# Patient Record
Sex: FEMALE | Employment: FULL TIME | URBAN - METROPOLITAN AREA
[De-identification: names, ages, dates, MRNs, and addresses within clinical notes are randomized per-mention and may not be internally consistent; named-entity substitution may affect disease eponyms.]

---

## 2017-01-13 ENCOUNTER — OFFICE VISIT (OUTPATIENT)
Dept: OBGYN | Facility: CLINIC | Age: 57
End: 2017-01-13
Attending: OBSTETRICS & GYNECOLOGY
Payer: COMMERCIAL

## 2017-01-13 VITALS
WEIGHT: 109 LBS | BODY MASS INDEX: 23.51 KG/M2 | HEIGHT: 57 IN | DIASTOLIC BLOOD PRESSURE: 79 MMHG | HEART RATE: 65 BPM | SYSTOLIC BLOOD PRESSURE: 138 MMHG

## 2017-01-13 DIAGNOSIS — Z79.890 HORMONE REPLACEMENT THERAPY: ICD-10-CM

## 2017-01-13 DIAGNOSIS — Z01.419 ENCOUNTER FOR GYNECOLOGICAL EXAMINATION WITHOUT ABNORMAL FINDING: Primary | ICD-10-CM

## 2017-01-13 DIAGNOSIS — Z13.220 LIPID SCREENING: ICD-10-CM

## 2017-01-13 DIAGNOSIS — B37.31 VAGINAL YEAST INFECTION: ICD-10-CM

## 2017-01-13 PROCEDURE — 99212 OFFICE O/P EST SF 10 MIN: CPT | Mod: ZF

## 2017-01-13 RX ORDER — FLUCONAZOLE 150 MG/1
150 TABLET ORAL ONCE
Qty: 2 TABLET | Refills: 3 | Status: SHIPPED | OUTPATIENT
Start: 2017-01-13 | End: 2017-01-13

## 2017-01-13 NOTE — PROGRESS NOTES
Progress Note    SUBJECTIVE:  Diana Tay is an 56 year old  No obstetric history on file., who requests a breast and pelvic exam.    Patient is followed by Dr. Barriga for primary care.    Concerns today include: discussion of hormone therapy.  Patient has used the estradiol ring (Femring) and aygestin for hormone therapy for a while.  She denies hot flashes and night sweats.  She is bothered by some residual vaginal dryness and has used vagifem on a prn basis.  She wonders what she should do for hormone therapy now.    She requests a refill of diflucan in case she needs it on an upcoming cruise.    Menstrual History:  Menstrual History 10/15/2012 1/27/2014 1/25/2016 7/29/2016   LAST MENSTRUAL PERIOD 9/21/2012 1/10/2014 10/25/2015 7/26/2016       Last  No results found for this basename: pap  History of abnormal Pap smear: NO - age 30- 65 PAP every 3 years recommended, done at outside clinic and is up to date.    Last No results found for this basename: HPV16  Last No results found for this basename: hpv18  Last No results found for this basename: hrhpv    Mammogram current: yes    HISTORY:  Prescription Medications as of 1/13/2017             Estradiol Acetate (FEMRING) 0.05 MG/24HR RING Place 1 Device vaginally every 3 months    norethindrone (AYGESTIN) 5 MG tablet Take 5 mg by mouth daily Once daily for 10 days every three months with placement of new ring.    Blood Pressure Monitor KIT Check blood pressure twice daily    Omega-3 Fatty Acids (OMEGA-3 FISH OIL PO)     Phenazopyridine HCl (AZO DINE PO)     Cholecalciferol (HM VITAMIN D3) 4000 UNITS CAPS     estradiol (VAGIFEM) 10 MCG TABS Place 10 mcg vaginally twice a week        No Known Allergies  Immunization History   Administered Date(s) Administered     Influenza (IIV3) 10/15/2013     Tdap (Adacel,Boostrix) 08/09/2011       Obstetric History     No data available     No past medical history on file.  Past Surgical History   Procedure Laterality Date  "     section       Colonoscopy N/A 11/3/2014     Procedure: COLONOSCOPY;  Surgeon: Sukumar Han MD;  Location: [facility]     Family History   Problem Relation Age of Onset     Thyroid Disease Mother      Lipids Mother      HEART DISEASE Father      Cancer - colorectal No family hx of      Social History     Social History     Marital Status:      Spouse Name: N/A     Number of Children: N/A     Years of Education: N/A     Social History Main Topics     Smoking status: Never Smoker      Smokeless tobacco: Never Used     Alcohol Use: Yes      Comment: 6 glasses of wine a week.     Drug Use: No     Sexual Activity:     Partners: Male     Birth Control/ Protection: Surgical     Other Topics Concern     Parent/Sibling W/ Cabg, Mi Or Angioplasty Before 65f 55m? Yes     Social History Narrative       ROS    EXAM:  Blood pressure 138/79, pulse 65, height 1.448 m (4' 9\"), weight 49.442 kg (109 lb), not currently breastfeeding. Body mass index is 23.58 kg/(m^2).  General appearance: Pleasant female in no acute distress.     BREAST EXAM:  Breast: Without visible skin changes. No dimpling or lesions seen.   Breasts supple, non-tender with palpation, no dominant mass, nodularity, or nipple discharge noted bilaterally. Axillary nodes negative.      PELVIC EXAM:  EG/BUS: Normal genital architecture without lesions, erythema or abnormal secretions Bartholin's, Urethra, [place]'s normal   Urethral meatus: normal    Urethra: no masses, tenderness, or scarring    Bladder: no masses or tenderness    Vagina: moist, pink, rugae with creamy, white and odorless  secretions  Cervix: no lesions and pink, moist, closed, without lesion or CMT  Uterus: small, smooth, firm, mobile w/o pain  Adnexa: Within normal limits and No masses, nodularity, tenderness  Rectum:anus normal, digital rectal exam negative, anal sphincter tone normal, rectal/vaginal exam confirms above findings       ASSESSMENT:  Encounter Diagnoses   Name Primary? "     Encounter for gynecological examination without abnormal finding [Z01.419] Yes     Hormone replacement therapy      Vaginal yeast infection      Lipid screening       56 year old Female Pelvic and Breast Exam  HRT Surveillance    PLAN:   Orders Placed This Encounter   Procedures     Pelvic and Breast Exam Procedure []     Lipid Profile     Discussed hormone therapy and use of progesterone.  Patient interested in trial of vaginal estrogen only and will switch of Estring.  No progesterone therapy needed.    Discussed vulvar cares, avoid soap, cotton underwear, avoid topical therapy    Occasional yeast infections and going on a cruise request prescription for diflucan, sent to pharmacy    Review of records indication mildly elevated LDL, started fish oil , will repeat, to have labs drawn at work and faxed here.    Return in one year/PRN for preventive care or problems/concerns.     Verbalized understanding and agreement with visit plan.    Ines Diaz MD

## 2017-01-13 NOTE — Clinical Note
1/13/2017       RE: Diana Tay  [address] [facility] [address]     Dear Colleague,    Thank you for referring your patient, Diana Tay, to the [facility] at [facility]. Please see a copy of my visit note below.      Progress Note    SUBJECTIVE:  Diana Tay is an 56 year old  No obstetric history on file., who requests a breast and pelvic exam.    Patient is followed by Dr. Barriga for primary care.    Concerns today include: discussion of hormone therapy.  Patient has used the estradiol ring (Femring) and aygestin for hormone therapy for a while.  She denies hot flashes and night sweats.  She is bothered by some residual vaginal dryness and has used vagifem on a prn basis.  She wonders what she should do for hormone therapy now.    She requests a refill of diflucan in case she needs it on an upcoming cruise.    Menstrual History:  Menstrual History 10/15/2012 1/27/2014 1/25/2016 7/29/2016   LAST MENSTRUAL PERIOD 9/21/2012 1/10/2014 10/25/2015 7/26/2016       Last  No results found for this basename: pap  History of abnormal Pap smear: NO - age 30- 65 PAP every 3 years recommended, done at outside clinic and is up to date.    Last No results found for this basename: HPV16  Last No results found for this basename: hpv18  Last No results found for this basename: hrhpv    Mammogram current: yes    HISTORY:  Prescription Medications as of 1/13/2017             Estradiol Acetate (FEMRING) 0.05 MG/24HR RING Place 1 Device vaginally every 3 months    norethindrone (AYGESTIN) 5 MG tablet Take 5 mg by mouth daily Once daily for 10 days every three months with placement of new ring.    Blood Pressure Monitor KIT Check blood pressure twice daily    Omega-3 Fatty Acids (OMEGA-3 FISH OIL PO)     Phenazopyridine HCl (AZO DINE PO)     Cholecalciferol (HM VITAMIN D3) 4000 UNITS CAPS     estradiol (VAGIFEM) 10 MCG TABS Place 10 mcg vaginally twice a  "week        No Known Allergies  Immunization History   Administered Date(s) Administered     Influenza (IIV3) 10/15/2013     Tdap (Adacel,Boostrix) 2011       Obstetric History     No data available     No past medical history on file.  Past Surgical History   Procedure Laterality Date      section       Colonoscopy N/A 11/3/2014     Procedure: COLONOSCOPY;  Surgeon: Sukumar Han MD;  Location:  GI     Family History   Problem Relation Age of Onset     Thyroid Disease Mother      Lipids Mother      HEART DISEASE Father      Cancer - colorectal No family hx of      Social History     Social History     Marital Status:      Spouse Name: N/A     Number of Children: N/A     Years of Education: N/A     Social History Main Topics     Smoking status: Never Smoker      Smokeless tobacco: Never Used     Alcohol Use: Yes      Comment: 6 glasses of wine a week.     Drug Use: No     Sexual Activity:     Partners: Male     Birth Control/ Protection: Surgical     Other Topics Concern     Parent/Sibling W/ Cabg, Mi Or Angioplasty Before 65f 55m? Yes     Social History Narrative       ROS    EXAM:  Blood pressure 138/79, pulse 65, height 1.448 m (4' 9\"), weight 49.442 kg (109 lb), not currently breastfeeding. Body mass index is 23.58 kg/(m^2).  General appearance: Pleasant female in no acute distress.     BREAST EXAM:  Breast: Without visible skin changes. No dimpling or lesions seen.   Breasts supple, non-tender with palpation, no dominant mass, nodularity, or nipple discharge noted bilaterally. Axillary nodes negative.      PELVIC EXAM:  EG/BUS: Normal genital architecture without lesions, erythema or abnormal secretions Bartholin's, Urethra, [place]'s normal   Urethral meatus: normal    Urethra: no masses, tenderness, or scarring    Bladder: no masses or tenderness    Vagina: moist, pink, rugae with creamy, white and odorless  secretions  Cervix: no lesions and pink, moist, closed, without lesion or " CMT  Uterus: small, smooth, firm, mobile w/o pain  Adnexa: Within normal limits and No masses, nodularity, tenderness  Rectum:anus normal, digital rectal exam negative, anal sphincter tone normal, rectal/vaginal exam confirms above findings       ASSESSMENT:  Encounter Diagnoses   Name Primary?     Encounter for gynecological examination without abnormal finding [Z01.419] Yes     Hormone replacement therapy      Vaginal yeast infection      Lipid screening       56 year old Female Pelvic and Breast Exam  HRT Surveillance    PLAN:   Orders Placed This Encounter   Procedures     Pelvic and Breast Exam Procedure []     Lipid Profile     Discussed hormone therapy and use of progesterone.  Patient interested in trial of vaginal estrogen only and will switch of Estring.  No progesterone therapy needed.    Discussed vulvar cares, avoid soap, cotton underwear, avoid topical therapy    Occasional yeast infections and going on a cruise request prescription for diflucan, sent to pharmacy    Review of records indication mildly elevated LDL, started fish oil , will repeat, to have labs drawn at work and faxed here.    Return in one year/PRN for preventive care or problems/concerns.     Verbalized understanding and agreement with visit plan.    Ines Diaz MD

## 2017-01-18 ENCOUNTER — AMBULATORY - HEALTHEAST (OUTPATIENT)
Dept: LAB | Facility: HOSPITAL | Age: 57
End: 2017-01-18

## 2017-01-18 ENCOUNTER — TRANSFERRED RECORDS (OUTPATIENT)
Dept: HEALTH INFORMATION MANAGEMENT | Facility: CLINIC | Age: 57
End: 2017-01-18

## 2017-01-18 DIAGNOSIS — Z01.419 ROUTINE GYNECOLOGICAL EXAMINATION: ICD-10-CM

## 2017-01-18 DIAGNOSIS — Z13.220 SCREENING FOR LIPOID DISORDERS: ICD-10-CM

## 2017-01-18 LAB
CHOLEST SERPL-MCNC: 242 MG/DL
FASTING STATUS PATIENT QL REPORTED: YES
HDLC SERPL-MCNC: 73 MG/DL
LDLC SERPL CALC-MCNC: 154 MG/DL
TRIGL SERPL-MCNC: 75 MG/DL

## 2017-02-14 ENCOUNTER — TELEPHONE (OUTPATIENT)
Dept: OBGYN | Facility: CLINIC | Age: 57
End: 2017-02-14

## 2017-05-16 ENCOUNTER — TELEPHONE (OUTPATIENT)
Dept: OBGYN | Facility: CLINIC | Age: 57
End: 2017-05-16

## 2017-05-16 DIAGNOSIS — Z13.9 SCREENING: Primary | ICD-10-CM

## 2017-05-16 NOTE — TELEPHONE ENCOUNTER
Routing pts request for lab work via [employer] to Dr. Diaz. She is requesting routine lab work for cholesterol, however needs HIV, hep b, and hep c because she received a puncture wound from an instrument at the dental office.

## 2017-05-17 ENCOUNTER — MYC MEDICAL ADVICE (OUTPATIENT)
Dept: OBGYN | Facility: CLINIC | Age: 57
End: 2017-05-17

## 2017-05-17 DIAGNOSIS — Z13.220 SCREENING CHOLESTEROL LEVEL: ICD-10-CM

## 2017-05-17 DIAGNOSIS — Z20.828 CONTACT WITH OR EXPOSURE TO VIRAL DISEASE: Primary | ICD-10-CM

## 2017-05-23 ENCOUNTER — AMBULATORY - HEALTHEAST (OUTPATIENT)
Dept: LAB | Facility: HOSPITAL | Age: 57
End: 2017-05-23

## 2017-05-23 ENCOUNTER — TRANSFERRED RECORDS (OUTPATIENT)
Dept: HEALTH INFORMATION MANAGEMENT | Facility: CLINIC | Age: 57
End: 2017-05-23

## 2017-05-23 DIAGNOSIS — Z13.220 SCREENING FOR CHOLESTEROL LEVEL: ICD-10-CM

## 2017-05-23 DIAGNOSIS — Z13.9 SCREENING: ICD-10-CM

## 2017-05-23 DIAGNOSIS — Z20.828 CONTACT WITH OR EXPOSURE TO VIRAL DISEASE: ICD-10-CM

## 2017-05-23 LAB
CHOLEST SERPL-MCNC: 201 MG/DL
FASTING STATUS PATIENT QL REPORTED: YES
HBV SURFACE AG SERPL QL IA: NEGATIVE
HCV AB SERPL QL IA: NEGATIVE
HDLC SERPL-MCNC: 66 MG/DL
HEPATITIS B SURFACE ANTIBODY LHE- HISTORICAL: POSITIVE
HIV 1+2 AB+HIV1 P24 AG SERPL QL IA: NEGATIVE
LDLC SERPL CALC-MCNC: 119 MG/DL
TRIGL SERPL-MCNC: 80 MG/DL

## 2018-01-05 DIAGNOSIS — Z79.890 HORMONE REPLACEMENT THERAPY: ICD-10-CM

## 2018-01-05 NOTE — TELEPHONE ENCOUNTER
Received refill request for estring. Due for annual. Left patient a message that she needs to call and make appt and that temporary refill will be sent.

## 2018-03-23 ENCOUNTER — OFFICE VISIT (OUTPATIENT)
Dept: OBGYN | Facility: CLINIC | Age: 58
End: 2018-03-23
Attending: OBSTETRICS & GYNECOLOGY
Payer: COMMERCIAL

## 2018-03-23 VITALS
DIASTOLIC BLOOD PRESSURE: 76 MMHG | HEART RATE: 60 BPM | SYSTOLIC BLOOD PRESSURE: 135 MMHG | HEIGHT: 57 IN | BODY MASS INDEX: 23.73 KG/M2 | WEIGHT: 110 LBS

## 2018-03-23 DIAGNOSIS — Z12.4 SCREENING FOR MALIGNANT NEOPLASM OF CERVIX: ICD-10-CM

## 2018-03-23 DIAGNOSIS — Z79.890 HORMONE REPLACEMENT THERAPY: ICD-10-CM

## 2018-03-23 DIAGNOSIS — Z01.419 ENCOUNTER FOR GYNECOLOGICAL EXAMINATION WITHOUT ABNORMAL FINDING: Primary | ICD-10-CM

## 2018-03-23 PROCEDURE — G0145 SCR C/V CYTO,THINLAYER,RESCR: HCPCS | Performed by: OBSTETRICS & GYNECOLOGY

## 2018-03-23 PROCEDURE — 87624 HPV HI-RISK TYP POOLED RSLT: CPT | Performed by: OBSTETRICS & GYNECOLOGY

## 2018-03-23 NOTE — LETTER
3/23/2018       RE: Diana Tay  [address] [facility] [address]     Dear Colleague,    Thank you for referring your patient, Diana Tay, to the [facility] at [facility]. Please see a copy of my visit note below.        Progress Note    SUBJECTIVE:  Diana Tay is an 57 year old, who requests a breast and pelvic exam.    Patient is followed by Dr. Barriga for primary care.    Concerns today include: Desires refill of Estring.  This is working well for vaginal menopausal symptoms. She has occasional hot flashes and night She just returned from a cruise and is doing well.    Menstrual History:  Menstrual History 10/15/2012 2014 2016 2016   LAST MENSTRUAL PERIOD 2012 1/10/2014 10/25/2015 2016       Last  No results found for: Done at [facility] 1/20/15 NIL  History of abnormal Pap smear: NO - age 30-65 PAP every 5 years with negative HPV co-testing recommended    No prior HPV testing completed    Mammogram current: yes and done at outside institution    HISTORY:  Prescription Medications as of 3/23/2018             estradiol (ESTRING) 2 MG vaginal ring Place 1 each vaginally every 3 months    Blood Pressure Monitor KIT Check blood pressure twice daily    Omega-3 Fatty Acids (OMEGA-3 FISH OIL PO)     Phenazopyridine HCl (AZO DINE PO)     Cholecalciferol (HM VITAMIN D3) 4000 UNITS CAPS         No Known Allergies  Immunization History   Administered Date(s) Administered     Influenza (IIV3) PF 10/15/2013     Tdap (Adacel,Boostrix) 2011       Obstetric History     No data available     No past medical history on file.  Past Surgical History:   Procedure Laterality Date      SECTION       COLONOSCOPY N/A 11/3/2014    Procedure: COLONOSCOPY;  Surgeon: Sukumar Han MD;  Location:  GI     Family History   Problem Relation Age of Onset     Thyroid Disease Mother      Lipids Mother      HEART  "DISEASE Father      Cancer - colorectal No family hx of      Social History     Social History     Marital status:      Spouse name: N/A     Number of children: N/A     Years of education: N/A     Social History Main Topics     Smoking status: Never Smoker     Smokeless tobacco: Never Used     Alcohol use Yes      Comment: 6 glasses of wine a week.     Drug use: No     Sexual activity: Yes     Partners: Male     Birth control/ protection: Surgical     Other Topics Concern     Parent/Sibling W/ Cabg, Mi Or Angioplasty Before 65f 55m? Yes     Social History Narrative       ROS    EXAM:  Blood pressure 135/76, pulse 60, height 1.448 m (4' 9\"), weight 49.9 kg (110 lb), last menstrual period 07/26/2016, not currently breastfeeding. Body mass index is 23.8 kg/(m^2).  General appearance: Pleasant female in no acute distress.     BREAST EXAM:  Breast: Tanned skin, no other visible skin changes. No dimpling or lesions seen.   Breasts supple, non-tender with palpation, no dominant mass, nodularity, or nipple discharge noted bilaterally. Axillary nodes negative.      PELVIC EXAM:  EG/BUS: Normal genital architecture without lesions, erythema or abnormal secretions Bartholin's, Urethra, [place]'s normal   Urethral meatus: normal    Urethra: no masses, tenderness, or scarring    Bladder: no masses or tenderness    Vagina: moist, pink, rugae with creamy, white and odorless secretions  Cervix: no lesions and pink, moist, closed, without lesion or CMT  Uterus: small, smooth, firm, mobile w/o pain  Adnexa: Within normal limits and No masses, nodularity, tenderness  Rectum:anus normal, digital rectal exam negative, anal sphincter tone normal, rectal/vaginal exam confirms above findings       ASSESSMENT:  Encounter Diagnoses   Name Primary?     Encounter for gynecological examination without abnormal finding Yes     Hormone replacement therapy      Screening for malignant neoplasm of cervix       57 year old Female Pelvic and " Breast Exam  Vaginal estrogen for urogenital symptoms of menopause    PLAN:   Orders Placed This Encounter   Procedures     Pelvic and Breast Exam Procedure []     Pap Smear Exam []     Pap imaged thin layer screen with HPV - recommended age 30 - 65 years (select HPV order below)     HPV High Risk Types DNA Cervical       Return in one year/PRN for preventive care or problems/concerns.     Verbalized understanding and agreement with visit plan.      Again, thank you for allowing me to participate in the care of your patient.      Sincerely,    Ines Diaz MD

## 2018-03-23 NOTE — MR AVS SNAPSHOT
"              After Visit Summary   3/23/2018    Diana Tay    MRN: [id number]           Patient Information     Date Of Birth          1960        Visit Information        Provider Department      3/23/2018 3:45 PM Ines Diaz MD [facility]        Today's Diagnoses     Encounter for gynecological examination without abnormal finding    -  1    Hormone replacement therapy        Screening for malignant neoplasm of cervix           Follow-ups after your visit        Follow-up notes from your care team     Return in 1 year (on 3/23/2019) for Preventative Health Visit.      Who to contact     Please call your clinic at [phone] to:    Ask questions about your health    Make or cancel appointments    Discuss your medicines    Learn about your test results    Speak to your doctor            Additional Information About Your Visit        MyChart Information     [employer] gives you secure access to your electronic health record. If you see a primary care provider, you can also send messages to your care team and make appointments. If you have questions, please call your primary care clinic.  If you do not have a primary care provider, please call [phone] and they will assist you.      [employer] is an electronic gateway that provides easy, online access to your medical records. With [employer], you can request a clinic appointment, read your test results, renew a prescription or communicate with your care team.     To access your existing account, please contact your [facility] or call [phone] for assistance.        Care EveryWhere ID     This is your Care EveryWhere ID. This could be used by other organizations to access your [facility] medical records  [id number]        Your Vitals Were     Pulse Height Last Period Breastfeeding? BMI (Body Mass Index)       60 1.448 m (4' 9\") 07/26/2016 No 23.8 kg/m2        Blood Pressure from Last 3 Encounters: "   03/23/18 135/76   01/13/17 138/79   07/29/16 130/72    Weight from Last 3 Encounters:   03/23/18 49.9 kg (110 lb)   01/13/17 49.4 kg (109 lb)   07/29/16 49.4 kg (108 lb 14.4 oz)              We Performed the Following     HPV High Risk Types DNA Cervical     Pap imaged thin layer screen with HPV - recommended age 30 - 65 years (select HPV order below)     Pap Smear Exam []     Pelvic and Breast Exam Procedure []          Today's Medication Changes          These changes are accurate as of 3/23/18 11:59 PM.  If you have any questions, ask your nurse or doctor.               Start taking these medicines.        Dose/Directions    estradiol 2 MG vaginal ring   Commonly known as:  ESTRING   Used for:  Hormone replacement therapy   Started by:  Ines Diaz MD        Dose:  1 each   Place 1 each vaginally every 3 months   Quantity:  1 each   Refills:  3            Where to get your medicines      These medications were sent to [facility] [address]  [address], [facility] [address]     Phone:  [phone]     estradiol 2 MG vaginal ring                Primary Care Provider Office Phone # Fax #    Tadeo Barriga [phone]       [address]   [facility] [address]        Equal Access to Services     SILVER SADLER : Hadnoman vazquezo Sotrever, waaxda luqadaha, qaybta kaalmada gianluca, cornel albarado. So [facility] [phone].    ATENCIÓN: Si habla español, tiene a nuñez disposición servicios gratuitos de asistencia lingüística. Llame al [phone].    We comply with applicable federal civil rights laws and [place] laws. We do not discriminate on the basis of race, color, national origin, age, disability, sex, sexual orientation, or gender identity.            Thank you!     Thank you for choosing [facility]  for your care. Our goal is always to provide you with  excellent care. Hearing back from our patients is one way we can continue to improve our services. Please take a few minutes to complete the written survey that you may receive in the mail after your visit with us. Thank you!             Your Updated Medication List - Protect others around you: Learn how to safely use, store and throw away your medicines at www.disposemymeds.org.          This list is accurate as of 3/23/18 11:59 PM.  Always use your most recent med list.                   Brand Name Dispense Instructions for use Diagnosis    AZO DINE PO           Blood Pressure Monitor Kit     1 kit    Check blood pressure twice daily    Elevated blood pressure reading without diagnosis of hypertension       estradiol 2 MG vaginal ring    ESTRING    1 each    Place 1 each vaginally every 3 months    Hormone replacement therapy       HM VITAMIN D3 4000 UNITS Caps   Generic drug:  Cholecalciferol           OMEGA-3 FISH OIL PO

## 2018-03-23 NOTE — PROGRESS NOTES
Progress Note    SUBJECTIVE:  Diana Tay is an 57 year old, who requests a breast and pelvic exam.    Patient is followed by Dr. Barriga for primary care.    Concerns today include: Desires refill of Estring.  This is working well for vaginal menopausal symptoms. She has occasional hot flashes and night She just returned from a cruise and is doing well.    Menstrual History:  Menstrual History 10/15/2012 2014 2016 2016   LAST MENSTRUAL PERIOD 2012 1/10/2014 10/25/2015 2016       Last  No results found for: Done at [facility] 1/20/15 NIL  History of abnormal Pap smear: NO - age 30-65 PAP every 5 years with negative HPV co-testing recommended    No prior HPV testing completed    Mammogram current: yes and done at outside institution    HISTORY:  Prescription Medications as of 3/23/2018             estradiol (ESTRING) 2 MG vaginal ring Place 1 each vaginally every 3 months    Blood Pressure Monitor KIT Check blood pressure twice daily    Omega-3 Fatty Acids (OMEGA-3 FISH OIL PO)     Phenazopyridine HCl (AZO DINE PO)     Cholecalciferol (HM VITAMIN D3) 4000 UNITS CAPS         No Known Allergies  Immunization History   Administered Date(s) Administered     Influenza (IIV3) PF 10/15/2013     Tdap (Adacel,Boostrix) 2011       Obstetric History     No data available     No past medical history on file.  Past Surgical History:   Procedure Laterality Date      SECTION       COLONOSCOPY N/A 11/3/2014    Procedure: COLONOSCOPY;  Surgeon: Sukumar Han MD;  Location:  GI     Family History   Problem Relation Age of Onset     Thyroid Disease Mother      Lipids Mother      HEART DISEASE Father      Cancer - colorectal No family hx of      Social History     Social History     Marital status:      Spouse name: N/A     Number of children: N/A     Years of education: N/A     Social History Main Topics     Smoking status: Never Smoker     Smokeless tobacco: Never Used  "    Alcohol use Yes      Comment: 6 glasses of wine a week.     Drug use: No     Sexual activity: Yes     Partners: Male     Birth control/ protection: Surgical     Other Topics Concern     Parent/Sibling W/ Cabg, Mi Or Angioplasty Before 65f 55m? Yes     Social History Narrative       ROS    EXAM:  Blood pressure 135/76, pulse 60, height 1.448 m (4' 9\"), weight 49.9 kg (110 lb), last menstrual period 07/26/2016, not currently breastfeeding. Body mass index is 23.8 kg/(m^2).  General appearance: Pleasant female in no acute distress.     BREAST EXAM:  Breast: Tanned skin, no other visible skin changes. No dimpling or lesions seen.   Breasts supple, non-tender with palpation, no dominant mass, nodularity, or nipple discharge noted bilaterally. Axillary nodes negative.      PELVIC EXAM:  EG/BUS: Normal genital architecture without lesions, erythema or abnormal secretions Bartholin's, Urethra, [place]'s normal   Urethral meatus: normal    Urethra: no masses, tenderness, or scarring    Bladder: no masses or tenderness    Vagina: moist, pink, rugae with creamy, white and odorless secretions  Cervix: no lesions and pink, moist, closed, without lesion or CMT  Uterus: small, smooth, firm, mobile w/o pain  Adnexa: Within normal limits and No masses, nodularity, tenderness  Rectum:anus normal, digital rectal exam negative, anal sphincter tone normal, rectal/vaginal exam confirms above findings       ASSESSMENT:  Encounter Diagnoses   Name Primary?     Encounter for gynecological examination without abnormal finding Yes     Hormone replacement therapy      Screening for malignant neoplasm of cervix       57 year old Female Pelvic and Breast Exam  Vaginal estrogen for urogenital symptoms of menopause    PLAN:   Orders Placed This Encounter   Procedures     Pelvic and Breast Exam Procedure []     Pap Smear Exam []     Pap imaged thin layer screen with HPV - recommended age 30 - 65 years (select HPV order below)     HPV " High Risk Types DNA Cervical       Return in one year/PRN for preventive care or problems/concerns.     Verbalized understanding and agreement with visit plan.    Ines Diaz MD

## 2018-03-27 LAB
COPATH REPORT: NORMAL
PAP: NORMAL

## 2018-03-29 LAB
FINAL DIAGNOSIS: NORMAL
HPV HR 12 DNA CVX QL NAA+PROBE: NEGATIVE
HPV16 DNA SPEC QL NAA+PROBE: NEGATIVE
HPV18 DNA SPEC QL NAA+PROBE: NEGATIVE
SPECIMEN DESCRIPTION: NORMAL
SPECIMEN SOURCE CVX/VAG CYTO: NORMAL

## 2018-04-09 ENCOUNTER — TRANSFERRED RECORDS (OUTPATIENT)
Dept: HEALTH INFORMATION MANAGEMENT | Facility: CLINIC | Age: 58
End: 2018-04-09

## 2019-02-19 ENCOUNTER — OFFICE VISIT (OUTPATIENT)
Dept: OBGYN | Facility: CLINIC | Age: 59
End: 2019-02-19
Attending: OBSTETRICS & GYNECOLOGY
Payer: COMMERCIAL

## 2019-02-19 VITALS
DIASTOLIC BLOOD PRESSURE: 81 MMHG | SYSTOLIC BLOOD PRESSURE: 132 MMHG | HEIGHT: 57 IN | HEART RATE: 81 BPM | BODY MASS INDEX: 22.87 KG/M2 | WEIGHT: 106 LBS

## 2019-02-19 DIAGNOSIS — Z01.419 ENCOUNTER FOR GYNECOLOGICAL EXAMINATION WITHOUT ABNORMAL FINDING: Primary | ICD-10-CM

## 2019-02-19 DIAGNOSIS — Z79.890 HORMONE REPLACEMENT THERAPY: ICD-10-CM

## 2019-02-19 DIAGNOSIS — N94.10 DYSPAREUNIA, FEMALE: ICD-10-CM

## 2019-02-19 DIAGNOSIS — N95.2 VAGINAL ATROPHY: ICD-10-CM

## 2019-02-19 PROCEDURE — G0463 HOSPITAL OUTPT CLINIC VISIT: HCPCS | Mod: ZF

## 2019-02-19 RX ORDER — MULTIPLE VITAMINS W/ MINERALS TAB 9MG-400MCG
1 TAB ORAL DAILY
COMMUNITY

## 2019-02-19 RX ORDER — ESTRADIOL 0.1 MG/G
CREAM VAGINAL
Qty: 48 G | Refills: 3 | Status: SHIPPED | OUTPATIENT
Start: 2019-02-19

## 2019-02-19 ASSESSMENT — MIFFLIN-ST. JEOR: SCORE: 939.69

## 2019-02-19 ASSESSMENT — PAIN SCALES - GENERAL: PAINLEVEL: NO PAIN (0)

## 2019-02-19 NOTE — LETTER
2/19/2019       RE: Diana Tay  [address] [facility] [id number]     Dear Colleague,    Thank you for referring your patient, Diana Tay, to the [facility] at [facility]. Please see a copy of my visit note below.    Progress Note    SUBJECTIVE:  Diana Tay is an 58 year old, who requests a breast and pelvic exam.    Patient is followed by Dr. Barriga for primary care.    Concerns today include: Desires refill of Estring.  This is working well for vaginal menopausal symptoms. She has occasional hot flashes and night She just returned from a cruise and is doing well.  She has pain at the introitus with intercourse.    Menstrual History:  Menstrual History 10/15/2012 1/27/2014 1/25/2016 7/29/2016   LAST MENSTRUAL PERIOD 9/21/2012 1/10/2014 10/25/2015 7/26/2016     Pap:   Last done in 2018, NILM and HPV neg, recommend repeat in 5 years (2023)  History of abnormal Pap smear: NO - age 30-65 PAP every 5 years with negative HPV co-testing recommended    Mammogram current: yes and done at outside institution    HISTORY:  Prescription Medications as of 2/19/2019       Rx Number Disp Refills Start End Last Dispensed Date Next Fill Date Owning Pharmacy    Blood Pressure Monitor KIT  1 kit 0 7/18/2016    [place], [place] - [address]    Sig: Check blood pressure twice daily    Class: E-Prescribe    Cholecalciferol (HM VITAMIN D3) 4000 UNITS CAPS            Class: Historical    Route: Oral    estradiol (ESTRING) 2 MG vaginal ring  1 each 3 3/23/2018    [place], [place] - [address]    Sig: Place 1 each vaginally every 3 months    Class: E-Prescribe    Route: Vaginal    multivitamin w/minerals (MULTI-VITAMIN) tablet        [place], [place] - [address]    Sig: Take 1 tablet by mouth daily    Class: Historical    Route: Oral    Omega-3 Fatty Acids  "(OMEGA-3 FISH OIL PO)            Class: Historical    Route: Oral    Phenazopyridine HCl (AZO DINE PO)            Class: Historical    Route: Oral        No Known Allergies  Immunization History   Administered Date(s) Administered     Influenza (IIV3) PF 10/15/2013     Tdap (Adacel,Boostrix) 2011       Obstetric History     No data available     No past medical history on file.  Past Surgical History:   Procedure Laterality Date      SECTION       COLONOSCOPY N/A 11/3/2014    Procedure: COLONOSCOPY;  Surgeon: Sukumar Han MD;  Location:  GI     Family History   Problem Relation Age of Onset     Thyroid Disease Mother      Lipids Mother      Heart Disease Father      Cancer - colorectal No family hx of      Social History     Social History     Marital status:      Spouse name: N/A     Number of children: N/A     Years of education: N/A     Social History Main Topics     Smoking status: Never Smoker     Smokeless tobacco: Never Used     Alcohol use Yes      Comment: 6 glasses of wine a week.     Drug use: No     Sexual activity: Yes     Partners: Male     Birth control/ protection: Surgical     Other Topics Concern     Parent/Sibling W/ Cabg, Mi Or Angioplasty Before 65f 55m? Yes     Social History Narrative       ROS    EXAM:  Blood pressure 132/81, pulse 81, height 1.448 m (4' 9\"), weight 48.1 kg (106 lb), last menstrual period 2016, not currently breastfeeding. Body mass index is 22.94 kg/m .  General appearance: Pleasant female in no acute distress.     BREAST EXAM:  Breast: Tanned skin, no other visible skin changes. No dimpling or lesions seen.   Breasts supple, non-tender with palpation, no dominant mass, nodularity, or nipple discharge noted bilaterally. Axillary nodes negative.      PELVIC EXAM:  EG/BUS: Normal genital architecture without lesions, erythema or abnormal secretions Bartholin's, Urethra, [place]'s normal   Urethral meatus: normal    Urethra: no masses, " tenderness, or scarring    Bladder: no masses or tenderness    Vagina: moist, pink, rugae with creamy, white and odorless secretions  Cervix: no lesions and pink, moist, closed, without lesion or CMT  Uterus: small, smooth, firm, mobile w/o pain  Adnexa: Within normal limits and No masses, nodularity, tenderness  Rectum:anus normal, digital rectal exam negative, anal sphincter tone normal, rectal/vaginal exam confirms above findings       ASSESSMENT:  Encounter Diagnoses   Name Primary?     Encounter for gynecological examination without abnormal finding Yes     Hormone replacement therapy      Vaginal atrophy      Dyspareunia, female       57 year old Female Pelvic and Breast Exam  Vaginal estrogen for urogenital symptoms of menopause    PLAN:   Orders Placed This Encounter   Procedures     Pelvic and Breast Exam Procedure []     Discussed additional estrogen cream at introitus, prescription for estrace cream given.  Refill of Estring.    Return in one year/PRN for preventive care or problems/concerns.     Verbalized understanding and agreement with visit plan.    Sammie Escobar MD  OB/GYN PGY-1  02/19/19 4:01 PM     Appreciate Dr. Escobar's note above, patient also seen and examined by me along with resident. I agree with the note above.     Ines Diaz MD

## 2019-02-19 NOTE — PROGRESS NOTES
Progress Note    SUBJECTIVE:  Diana Tay is an 58 year old, who requests a breast and pelvic exam.    Patient is followed by Dr. Barriga for primary care.    Concerns today include: Desires refill of Estring.  This is working well for vaginal menopausal symptoms. She has occasional hot flashes and night She just returned from a cruise and is doing well.  She has pain at the introitus with intercourse.    Menstrual History:  Menstrual History 10/15/2012 1/27/2014 1/25/2016 7/29/2016   LAST MENSTRUAL PERIOD 9/21/2012 1/10/2014 10/25/2015 7/26/2016     Pap:   Last done in 2018, NILM and HPV neg, recommend repeat in 5 years (2023)  History of abnormal Pap smear: NO - age 30-65 PAP every 5 years with negative HPV co-testing recommended    Mammogram current: yes and done at outside institution    HISTORY:  Prescription Medications as of 2/19/2019       Rx Number Disp Refills Start End Last Dispensed Date Next Fill Date Owning Pharmacy    Blood Pressure Monitor KIT  1 kit 0 7/18/2016    [place], [place] - [address]    Sig: Check blood pressure twice daily    Class: E-Prescribe    Cholecalciferol (HM VITAMIN D3) 4000 UNITS CAPS            Class: Historical    Route: Oral    estradiol (ESTRING) 2 MG vaginal ring  1 each 3 3/23/2018    [place], [place] - [address]    Sig: Place 1 each vaginally every 3 months    Class: E-Prescribe    Route: Vaginal    multivitamin w/minerals (MULTI-VITAMIN) tablet        [place], [place] - [address]    Sig: Take 1 tablet by mouth daily    Class: Historical    Route: Oral    Omega-3 Fatty Acids (OMEGA-3 FISH OIL PO)            Class: Historical    Route: Oral    Phenazopyridine HCl (AZO DINE PO)            Class: Historical    Route: Oral        No Known Allergies  Immunization History   Administered Date(s) Administered     Influenza (IIV3) PF 10/15/2013     Tdap (Adacel,Boostrix)  "2011       Obstetric History     No data available     No past medical history on file.  Past Surgical History:   Procedure Laterality Date      SECTION       COLONOSCOPY N/A 11/3/2014    Procedure: COLONOSCOPY;  Surgeon: Sukumar Han MD;  Location:  GI     Family History   Problem Relation Age of Onset     Thyroid Disease Mother      Lipids Mother      Heart Disease Father      Cancer - colorectal No family hx of      Social History     Social History     Marital status:      Spouse name: N/A     Number of children: N/A     Years of education: N/A     Social History Main Topics     Smoking status: Never Smoker     Smokeless tobacco: Never Used     Alcohol use Yes      Comment: 6 glasses of wine a week.     Drug use: No     Sexual activity: Yes     Partners: Male     Birth control/ protection: Surgical     Other Topics Concern     Parent/Sibling W/ Cabg, Mi Or Angioplasty Before 65f 55m? Yes     Social History Narrative       ROS    EXAM:  Blood pressure 132/81, pulse 81, height 1.448 m (4' 9\"), weight 48.1 kg (106 lb), last menstrual period 2016, not currently breastfeeding. Body mass index is 22.94 kg/m .  General appearance: Pleasant female in no acute distress.     BREAST EXAM:  Breast: Tanned skin, no other visible skin changes. No dimpling or lesions seen.   Breasts supple, non-tender with palpation, no dominant mass, nodularity, or nipple discharge noted bilaterally. Axillary nodes negative.      PELVIC EXAM:  EG/BUS: Normal genital architecture without lesions, erythema or abnormal secretions Bartholin's, Urethra, [place]'s normal   Urethral meatus: normal    Urethra: no masses, tenderness, or scarring    Bladder: no masses or tenderness    Vagina: moist, pink, rugae with creamy, white and odorless secretions  Cervix: no lesions and pink, moist, closed, without lesion or CMT  Uterus: small, smooth, firm, mobile w/o pain  Adnexa: Within normal limits and No masses, nodularity, " tenderness  Rectum:anus normal, digital rectal exam negative, anal sphincter tone normal, rectal/vaginal exam confirms above findings       ASSESSMENT:  Encounter Diagnoses   Name Primary?     Encounter for gynecological examination without abnormal finding Yes     Hormone replacement therapy      Vaginal atrophy      Dyspareunia, female       57 year old Female Pelvic and Breast Exam  Vaginal estrogen for urogenital symptoms of menopause    PLAN:   Orders Placed This Encounter   Procedures     Pelvic and Breast Exam Procedure []     Discussed additional estrogen cream at introitus, prescription for estrace cream given.  Refill of Estring.    Return in one year/PRN for preventive care or problems/concerns.     Verbalized understanding and agreement with visit plan.    Sammie Escobar MD  OB/GYN PGY-1  02/19/19 4:01 PM     Appreciate Dr. Escobar's note above, patient also seen and examined by me along with resident. I agree with the note above.   Ines Diaz MD

## 2019-04-09 ENCOUNTER — TRANSFERRED RECORDS (OUTPATIENT)
Dept: HEALTH INFORMATION MANAGEMENT | Facility: CLINIC | Age: 59
End: 2019-04-09

## 2019-09-30 ENCOUNTER — HEALTH MAINTENANCE LETTER (OUTPATIENT)
Age: 59
End: 2019-09-30

## 2020-01-31 ENCOUNTER — OFFICE VISIT (OUTPATIENT)
Dept: OBGYN | Facility: CLINIC | Age: 60
End: 2020-01-31
Attending: OBSTETRICS & GYNECOLOGY
Payer: COMMERCIAL

## 2020-01-31 VITALS
WEIGHT: 105.1 LBS | BODY MASS INDEX: 22.67 KG/M2 | DIASTOLIC BLOOD PRESSURE: 79 MMHG | HEART RATE: 64 BPM | HEIGHT: 57 IN | SYSTOLIC BLOOD PRESSURE: 123 MMHG

## 2020-01-31 DIAGNOSIS — Z00.00 VISIT FOR PREVENTIVE HEALTH EXAMINATION: Primary | ICD-10-CM

## 2020-01-31 DIAGNOSIS — Z79.890 HORMONE REPLACEMENT THERAPY: ICD-10-CM

## 2020-01-31 DIAGNOSIS — N95.2 VAGINAL ATROPHY: ICD-10-CM

## 2020-01-31 PROCEDURE — G0463 HOSPITAL OUTPT CLINIC VISIT: HCPCS | Mod: ZF

## 2020-01-31 ASSESSMENT — MIFFLIN-ST. JEOR: SCORE: 930.61

## 2020-01-31 NOTE — PROGRESS NOTES
Progress Note    SUBJECTIVE:  Diana Tay is an 59 year old, . who requests an Annual Preventive Exam.   She sees Dr. Barriga for routine care.  She is in good health.  She uses the estring for vaginal itching; she also uses some estrogen cream 3x/week.  She does not have problems with intercourse.   She denies Vaginal bleeding.    Menstrual History:  Menstrual History 2014   LAST MENSTRUAL PERIOD 1/10/2014 10/25/2015 2016       Last    Lab Results   Component Value Date    PAP NIL 2018         Last   Lab Results   Component Value Date    HPV16 Negative 2018     Last   Lab Results   Component Value Date    HPV18 Negative 2018     Last   Lab Results   Component Value Date    HRHPV Negative 2018       Mammogram current: yes  Last Mammogram: 2019      Last Colonoscopy:  Results for orders placed or performed during the hospital encounter of 14   COLONOSCOPY   Result Value Ref Range    COLONOSCOPY       [facility]  _______________________________________________________________________________  Patient Name: Diana Tay        Procedure Date: 11/3/2014 2:07 PM  MRN: [id number]                       Account Number: [id number]  YOB: 1960             Admit Type: Outpatient  Age: 54                               Gender: Female  Attending MD: Sukumar Han MD    Instrument Name: P-138  _______________________________________________________________________________     Procedure:                Colonoscopy  Indications:              Screening for colorectal malignant neoplasm  Providers:                Sukumar Han MD  Referring MD:             Lorraine Horta MD, Tadeo Barriga MD  Medicines:                Midazolam 2 mg IV, Fentanyl 100 micrograms IV  Complications:            No immediate complications.  _______________________________________________________________________________  Procedure:                 Pre-Anesthesia Assessment:                             - Prior to the procedure, a History and Physical                             was performed, and patient medications and                             allergies were reviewed. The patient is competent.                             The risks and benefits of the procedure and the                             sedation options and risks were discussed with the                             patient. All questions were answered and informed                             consent was obtained. Patient identification and                             proposed procedure were verified by the physician                             in the procedure room. Mental Status Examination:                             alert and oriented. Airway Examination: normal                             oropharyngeal airway and neck mobility. Respiratory                             Examination: clear to auscultation. CV Examination:                             normal. Prophylactic Antibiotics: The patient do es                             not require prophylactic antibiotics. Prior                             Anticoagulants: The patient has taken no previous                             anticoagulant or antiplatelet agents. ASA Grade                             Assessment: I - A normal, healthy patient. After                             reviewing the risks and benefits, the patient was                             deemed in satisfactory condition to undergo the                             procedure. The anesthesia plan was to use moderate                             sedation / analgesia (conscious sedation).                             Immediately prior to administration of medications,                             the patient was re-assessed for adequacy to receive                             sedatives. The heart rate, respiratory rate, oxygen                             saturations, blood pressure,  adequacy of pulmonary                             ventilation, and response to care were flo tored                             throughout the procedure. The physical status of                             the patient was re-assessed after the procedure.                            After obtaining informed consent, the colonoscope                             was passed under direct vision. Throughout the                             procedure, the patient's blood pressure, pulse, and                             oxygen saturations were monitored continuously. The                             PCF-H190L [id number] was introduced through the anus                             and advanced to the cecum, identified by                             appendiceal orifice and ileocecal valve. The                             colonoscopy was performed without difficulty. The                             patient tolerated the procedure well. The quality                             of the bowel preparation was good.                                                                                   Finding s:       The perianal and digital rectal examinations were normal.       The entire examined colon appeared normal on direct and retroflexion        views.                                                                                   Impression:               - The entire examined colon is normal on direct and                             retroflexion views.  Recommendation:           - Repeat colonoscopy in 10 years for screening                             purposes.                                                                                   Procedure Code(s):       --- Professional ---       , Colorectal cancer screening; colonoscopy on individual not        meeting criteria for high risk  Diagnosis Code(s):       --- Professional ---       V76.51, Special screening for malignant neoplasms of colon    CPT copyright 2013  American Medical Association. All rights reserved.    The codes documented in this report are preliminary and upon  review may   be revised to meet  current compliance requirements.    Electronically signed by Sukumar Han MD  ____________________  Sukumar Han MD  11/3/2014 2:34 PM  I was physically present for the entire viewing portion of the exam.  __________________________  Number of Addenda: 0    Note Initiated On: 11/3/2014 2:07 PM  MRN:                      [id number]  Procedure Date:           11/3/2014 2:07:44 PM  Scope Withdrawal Time: 0 hours 6 minutes 43 seconds   Total Procedure Duration: 0 hours 11 minutes 49 seconds   Scope In: 2:17:46 PM  Scope Out: 2:29:35 PM           HISTORY:  Cholecalciferol (HM VITAMIN D3) 4000 UNITS CAPS,   estradiol (ESTRACE) 0.1 MG/GM vaginal cream, Apply a pea size amount to the introitus twice weekly  estradiol (ESTRING) 2 MG vaginal ring, Place 1 each vaginally every 3 months  Omega-3 Fatty Acids (OMEGA-3 FISH OIL PO),   Blood Pressure Monitor KIT, Check blood pressure twice daily  multivitamin w/minerals (MULTI-VITAMIN) tablet, Take 1 tablet by mouth daily  Phenazopyridine HCl (AZO DINE PO),     No current facility-administered medications on file prior to visit.     No Known Allergies  Immunization History   Administered Date(s) Administered     Influenza (IIV3) PF 10/15/2013     Tdap (Adacel,Boostrix) 2011       OB History   No obstetric history on file.     History reviewed. No pertinent past medical history.  Past Surgical History:   Procedure Laterality Date      SECTION       COLONOSCOPY N/A 11/3/2014    Procedure: COLONOSCOPY;  Surgeon: Sukumar Han MD;  Location:  GI     Family History   Problem Relation Age of Onset     Thyroid Disease Mother      Lipids Mother      Heart Disease Father      Cancer - colorectal No family hx of        ROS: decreased libido; back pain  No flowsheet data found.  FIDE-7 SCORE 2018   Total Score  "2 (minimal anxiety)   Total Score 2         EXAM:  Blood pressure 123/79, pulse 64, height 1.448 m (4' 9\"), weight 47.7 kg (105 lb 1.6 oz), last menstrual period 07/26/2016, not currently breastfeeding. Body mass index is 22.74 kg/m .  General - pleasant female in no acute distress.  Skin - no suspicious lesions or rashes  EENT-   euthyroid with out palpable nodules  Neck - supple without lymphadenopathy.  Lungs - clear to auscultation bilaterally.  Heart - regular rate and rhythm without murmur.  Abdomen - soft, nontender, nondistended, no masses or organomegaly noted.  Musculoskeletal - no gross deformities.  Neurological - normal  mental status.    Breast Exam:  Breast: Without visible skin changes. No dimpling or lesions seen.   Breasts supple, non-tender with palpation, no dominant mass, nodularity, or nipple discharge noted bilaterally. Axillary nodes negative.      Pelvic Exam:  EG/BUS: Normal genital architecture without lesions, erythema or abnormal secretions Bartholin's, Urethra, [place]'s normal   Urethral meatus: normal   Urethra: no masses, tenderness, or scarring   Bladder: no masses or tenderness   Vagina: moist, pink, rugae with creamy, white and odorless  secretions  Cervix: Multiparous, and no lesions  Uterus: anteverted,  and small, smooth, firm, mobile w/o pain  Adnexa: Within normal limits and No masses, nodularity, tenderness        ASSESSMENT:  Encounter Diagnoses   Name Primary?     Visit for preventive health examination Yes     Vaginal atrophy         PLAN: Return to clinic in one year.  Follow-up as needed.  Renew estring. OK to use estrogen cream as well.  Dr. Barriga for other non gyn issues  Pt current with mammogram; colonoscopy        "

## 2020-01-31 NOTE — LETTER
2020       RE: Diana Tay  [address] [facility] [id number]     Dear Colleague,    Thank you for referring your patient, Diana Tay, to the [facility] at [facility]. Please see a copy of my visit note below.      Progress Note    SUBJECTIVE:  Diana Tay is an 59 year old, . who requests an Annual Preventive Exam.   She sees Dr. Barriga for routine care.  She is in good health.  She uses the estring for vaginal itching; she also uses some estrogen cream 3x/week.  She does not have problems with intercourse.   She denies Vaginal bleeding.    Menstrual History:  Menstrual History 2014   LAST MENSTRUAL PERIOD 1/10/2014 10/25/2015 2016       Last    Lab Results   Component Value Date    PAP NIL 2018         Last   Lab Results   Component Value Date    HPV16 Negative 2018     Last   Lab Results   Component Value Date    HPV18 Negative 2018     Last   Lab Results   Component Value Date    HRHPV Negative 2018       Mammogram current: yes  Last Mammogram: 2019      Last Colonoscopy:  Results for orders placed or performed during the hospital encounter of 14   COLONOSCOPY   Result Value Ref Range    COLONOSCOPY       [facility]  _______________________________________________________________________________  Patient Name: Diana Tay        Procedure Date: 11/3/2014 2:07 PM  MRN: [id number]                       Account Number: [id number]  YOB: 1960             Admit Type: Outpatient  Age: 54                               Gender: Female  Attending MD: Sukumar Han MD    Instrument Name: [id number]  _______________________________________________________________________________     Procedure:                Colonoscopy  Indications:              Screening for colorectal malignant neoplasm  Providers:                Sukumar Han,  MD  Referring MD:             Lorraine Horta MD, Tadeo Barriga MD  Medicines:                Midazolam 2 mg IV, Fentanyl 100 micrograms IV  Complications:            No immediate complications.  _______________________________________________________________________________  Procedure:                Pre-Anesthesia Assessment:                             - Prior to the procedure, a History and Physical                             was performed, and patient medications and                             allergies were reviewed. The patient is competent.                             The risks and benefits of the procedure and the                             sedation options and risks were discussed with the                             patient. All questions were answered and informed                             consent was obtained. Patient identification and                             proposed procedure were verified by the physician                             in the procedure room. Mental Status Examination:                             alert and oriented. Airway Examination: normal                             oropharyngeal airway and neck mobility. Respiratory                             Examination: clear to auscultation. CV Examination:                             normal. Prophylactic Antibiotics: The patient do es                             not require prophylactic antibiotics. Prior                             Anticoagulants: The patient has taken no previous                             anticoagulant or antiplatelet agents. ASA Grade                             Assessment: I - A normal, healthy patient. After                             reviewing the risks and benefits, the patient was                             deemed in satisfactory condition to undergo the                             procedure. The anesthesia plan was to use moderate                             sedation / analgesia (conscious sedation).                              Immediately prior to administration of medications,                             the patient was re-assessed for adequacy to receive                             sedatives. The heart rate, respiratory rate, oxygen                             saturations, blood pressure, adequacy of pulmonary                             ventilation, and response to care were flo tored                             throughout the procedure. The physical status of                             the patient was re-assessed after the procedure.                            After obtaining informed consent, the colonoscope                             was passed under direct vision. Throughout the                             procedure, the patient's blood pressure, pulse, and                             oxygen saturations were monitored continuously. The                             PCF-H190L [id number] was introduced through the anus                             and advanced to the cecum, identified by                             appendiceal orifice and ileocecal valve. The                             colonoscopy was performed without difficulty. The                             patient tolerated the procedure well. The quality                             of the bowel preparation was good.                                                                                   Finding s:       The perianal and digital rectal examinations were normal.       The entire examined colon appeared normal on direct and retroflexion        views.                                                                                   Impression:               - The entire examined colon is normal on direct and                             retroflexion views.  Recommendation:           - Repeat colonoscopy in 10 years for screening                             purposes.                                                                                    Procedure Code(s):       --- Professional ---       , Colorectal cancer screening; colonoscopy on individual not        meeting criteria for high risk  Diagnosis Code(s):       --- Professional ---       V76.51, Special screening for malignant neoplasms of colon    CPT copyright 2013 [employer]. All rights reserved.    The codes documented in this report are preliminary and upon  review may   be revised to meet  current compliance requirements.    Electronically signed by Sukumar Han MD  ____________________  Sukumar Han MD  11/3/2014 2:34 PM  I was physically present for the entire viewing portion of the exam.  __________________________  Number of Addenda: 0    Note Initiated On: 11/3/2014 2:07 PM  MRN:                      [id number]  Procedure Date:           11/3/2014 2:07:44 PM  Scope Withdrawal Time: 0 hours 6 minutes 43 seconds   Total Procedure Duration: 0 hours 11 minutes 49 seconds   Scope In: 2:17:46 PM  Scope Out: 2:29:35 PM           HISTORY:  Cholecalciferol (HM VITAMIN D3) 4000 UNITS CAPS,   estradiol (ESTRACE) 0.1 MG/GM vaginal cream, Apply a pea size amount to the introitus twice weekly  estradiol (ESTRING) 2 MG vaginal ring, Place 1 each vaginally every 3 months  Omega-3 Fatty Acids (OMEGA-3 FISH OIL PO),   Blood Pressure Monitor KIT, Check blood pressure twice daily  multivitamin w/minerals (MULTI-VITAMIN) tablet, Take 1 tablet by mouth daily  Phenazopyridine HCl (AZO DINE PO),     No current facility-administered medications on file prior to visit.     No Known Allergies  Immunization History   Administered Date(s) Administered     Influenza (IIV3) PF 10/15/2013     Tdap (Adacel,Boostrix) 2011       OB History   No obstetric history on file.     History reviewed. No pertinent past medical history.  Past Surgical History:   Procedure Laterality Date      SECTION       COLONOSCOPY N/A 11/3/2014    Procedure: COLONOSCOPY;  Surgeon: Emely  "Sukumar ECHEVERRIA MD;  Location:  GI     Family History   Problem Relation Age of Onset     Thyroid Disease Mother      Lipids Mother      Heart Disease Father      Cancer - colorectal No family hx of        ROS: decreased libido; back pain  No flowsheet data found.  FIDE-7 SCORE 2/6/2018   Total Score 2 (minimal anxiety)   Total Score 2         EXAM:  Blood pressure 123/79, pulse 64, height 1.448 m (4' 9\"), weight 47.7 kg (105 lb 1.6 oz), last menstrual period 07/26/2016, not currently breastfeeding. Body mass index is 22.74 kg/m .  General - pleasant female in no acute distress.  Skin - no suspicious lesions or rashes  EENT-   euthyroid with out palpable nodules  Neck - supple without lymphadenopathy.  Lungs - clear to auscultation bilaterally.  Heart - regular rate and rhythm without murmur.  Abdomen - soft, nontender, nondistended, no masses or organomegaly noted.  Musculoskeletal - no gross deformities.  Neurological - normal  mental status.    Breast Exam:  Breast: Without visible skin changes. No dimpling or lesions seen.   Breasts supple, non-tender with palpation, no dominant mass, nodularity, or nipple discharge noted bilaterally. Axillary nodes negative.      Pelvic Exam:  EG/BUS: Normal genital architecture without lesions, erythema or abnormal secretions Bartholin's, Urethra, Landess's normal   Urethral meatus: normal   Urethra: no masses, tenderness, or scarring   Bladder: no masses or tenderness   Vagina: moist, pink, rugae with creamy, white and odorless  secretions  Cervix: Multiparous, and no lesions  Uterus: anteverted,  and small, smooth, firm, mobile w/o pain  Adnexa: Within normal limits and No masses, nodularity, tenderness        ASSESSMENT:  Encounter Diagnoses   Name Primary?     Visit for preventive health examination Yes     Vaginal atrophy       PLAN: Return to clinic in one year.  Follow-up as needed.  Renew estring. OK to use estrogen cream as well.  Dr. Barriga for other non gyn issues  Pt " current with mammogram; colonoscopy    Zaria Hernandez MD

## 2021-01-15 ENCOUNTER — HEALTH MAINTENANCE LETTER (OUTPATIENT)
Age: 61
End: 2021-01-15

## 2021-03-14 ENCOUNTER — HEALTH MAINTENANCE LETTER (OUTPATIENT)
Age: 61
End: 2021-03-14

## 2021-05-25 ENCOUNTER — RECORDS - HEALTHEAST (OUTPATIENT)
Dept: ADMINISTRATIVE | Facility: CLINIC | Age: 61
End: 2021-05-25

## 2021-05-26 ENCOUNTER — RECORDS - HEALTHEAST (OUTPATIENT)
Dept: ADMINISTRATIVE | Facility: CLINIC | Age: 61
End: 2021-05-26

## 2021-05-27 ENCOUNTER — RECORDS - HEALTHEAST (OUTPATIENT)
Dept: ADMINISTRATIVE | Facility: CLINIC | Age: 61
End: 2021-05-27

## 2021-05-28 ENCOUNTER — RECORDS - HEALTHEAST (OUTPATIENT)
Dept: ADMINISTRATIVE | Facility: CLINIC | Age: 61
End: 2021-05-28

## 2021-06-02 ENCOUNTER — RECORDS - HEALTHEAST (OUTPATIENT)
Dept: ADMINISTRATIVE | Facility: CLINIC | Age: 61
End: 2021-06-02

## 2021-07-04 ENCOUNTER — HEALTH MAINTENANCE LETTER (OUTPATIENT)
Age: 61
End: 2021-07-04

## 2021-07-13 ENCOUNTER — RECORDS - HEALTHEAST (OUTPATIENT)
Dept: ADMINISTRATIVE | Facility: CLINIC | Age: 61
End: 2021-07-13

## 2021-07-21 ENCOUNTER — RECORDS - HEALTHEAST (OUTPATIENT)
Dept: ADMINISTRATIVE | Facility: CLINIC | Age: 61
End: 2021-07-21

## 2021-10-24 ENCOUNTER — HEALTH MAINTENANCE LETTER (OUTPATIENT)
Age: 61
End: 2021-10-24

## 2022-04-10 ENCOUNTER — HEALTH MAINTENANCE LETTER (OUTPATIENT)
Age: 62
End: 2022-04-10

## 2022-10-15 ENCOUNTER — HEALTH MAINTENANCE LETTER (OUTPATIENT)
Age: 62
End: 2022-10-15

## 2023-06-01 ENCOUNTER — HEALTH MAINTENANCE LETTER (OUTPATIENT)
Age: 63
End: 2023-06-01

## 2023-08-20 ENCOUNTER — HEALTH MAINTENANCE LETTER (OUTPATIENT)
Age: 63
End: 2023-08-20